# Patient Record
Sex: MALE | Race: BLACK OR AFRICAN AMERICAN
[De-identification: names, ages, dates, MRNs, and addresses within clinical notes are randomized per-mention and may not be internally consistent; named-entity substitution may affect disease eponyms.]

---

## 2018-02-23 ENCOUNTER — HOSPITAL ENCOUNTER (EMERGENCY)
Dept: HOSPITAL 62 - ER | Age: 23
Discharge: HOME | End: 2018-02-23
Payer: OTHER GOVERNMENT

## 2018-02-23 VITALS — SYSTOLIC BLOOD PRESSURE: 116 MMHG | DIASTOLIC BLOOD PRESSURE: 74 MMHG

## 2018-02-23 DIAGNOSIS — B34.9: Primary | ICD-10-CM

## 2018-02-23 DIAGNOSIS — R50.9: ICD-10-CM

## 2018-02-23 DIAGNOSIS — R55: ICD-10-CM

## 2018-02-23 DIAGNOSIS — R42: ICD-10-CM

## 2018-02-23 DIAGNOSIS — R51: ICD-10-CM

## 2018-02-23 LAB
A TYPE INFLUENZA AG: NEGATIVE
ADD MANUAL DIFF: NO
ALBUMIN SERPL-MCNC: 4.2 G/DL (ref 3.5–5)
ALP SERPL-CCNC: 75 U/L (ref 38–126)
ALT SERPL-CCNC: 31 U/L (ref 21–72)
ANION GAP SERPL CALC-SCNC: 11 MMOL/L (ref 5–19)
APPEARANCE UR: CLEAR
APTT PPP: YELLOW S
AST SERPL-CCNC: 25 U/L (ref 17–59)
B INFLUENZA AG: NEGATIVE
BASOPHILS # BLD AUTO: 0 10^3/UL (ref 0–0.2)
BASOPHILS NFR BLD AUTO: 0.6 % (ref 0–2)
BILIRUB DIRECT SERPL-MCNC: 0 MG/DL (ref 0–0.4)
BILIRUB SERPL-MCNC: 1 MG/DL (ref 0.2–1.3)
BILIRUB UR QL STRIP: NEGATIVE
BUN SERPL-MCNC: 10 MG/DL (ref 7–20)
CALCIUM: 9.5 MG/DL (ref 8.4–10.2)
CHLORIDE SERPL-SCNC: 105 MMOL/L (ref 98–107)
CK MB SERPL-MCNC: < 0.22 NG/ML (ref ?–4.55)
CO2 SERPL-SCNC: 25 MMOL/L (ref 22–30)
EOSINOPHIL # BLD AUTO: 0.1 10^3/UL (ref 0–0.6)
EOSINOPHIL NFR BLD AUTO: 2.2 % (ref 0–6)
ERYTHROCYTE [DISTWIDTH] IN BLOOD BY AUTOMATED COUNT: 13.3 % (ref 11.5–14)
GLUCOSE SERPL-MCNC: 91 MG/DL (ref 75–110)
GLUCOSE UR STRIP-MCNC: NEGATIVE MG/DL
HCT VFR BLD CALC: 44.5 % (ref 37.9–51)
HGB BLD-MCNC: 15.4 G/DL (ref 13.5–17)
KETONES UR STRIP-MCNC: NEGATIVE MG/DL
LYMPHOCYTES # BLD AUTO: 1.3 10^3/UL (ref 0.5–4.7)
LYMPHOCYTES NFR BLD AUTO: 32.2 % (ref 13–45)
MCH RBC QN AUTO: 32.3 PG (ref 27–33.4)
MCHC RBC AUTO-ENTMCNC: 34.6 G/DL (ref 32–36)
MCV RBC AUTO: 94 FL (ref 80–97)
MONOCYTES # BLD AUTO: 0.8 10^3/UL (ref 0.1–1.4)
MONOCYTES NFR BLD AUTO: 19.2 % (ref 3–13)
NEUTROPHILS # BLD AUTO: 1.8 10^3/UL (ref 1.7–8.2)
NEUTS SEG NFR BLD AUTO: 45.8 % (ref 42–78)
NITRITE UR QL STRIP: NEGATIVE
PH UR STRIP: 6 [PH] (ref 5–9)
PLATELET # BLD: 205 10^3/UL (ref 150–450)
POTASSIUM SERPL-SCNC: 4.2 MMOL/L (ref 3.6–5)
PROT SERPL-MCNC: 6.9 G/DL (ref 6.3–8.2)
PROT UR STRIP-MCNC: NEGATIVE MG/DL
RBC # BLD AUTO: 4.76 10^6/UL (ref 4.35–5.55)
SODIUM SERPL-SCNC: 141.2 MMOL/L (ref 137–145)
SP GR UR STRIP: 1.02
TOTAL CELLS COUNTED % (AUTO): 100 %
TROPONIN I SERPL-MCNC: < 0.012 NG/ML
UROBILINOGEN UR-MCNC: NEGATIVE MG/DL (ref ?–2)
WBC # BLD AUTO: 4 10^3/UL (ref 4–10.5)

## 2018-02-23 PROCEDURE — 82550 ASSAY OF CK (CPK): CPT

## 2018-02-23 PROCEDURE — 80053 COMPREHEN METABOLIC PANEL: CPT

## 2018-02-23 PROCEDURE — 96374 THER/PROPH/DIAG INJ IV PUSH: CPT

## 2018-02-23 PROCEDURE — 36415 COLL VENOUS BLD VENIPUNCTURE: CPT

## 2018-02-23 PROCEDURE — 93010 ELECTROCARDIOGRAM REPORT: CPT

## 2018-02-23 PROCEDURE — 96361 HYDRATE IV INFUSION ADD-ON: CPT

## 2018-02-23 PROCEDURE — 93005 ELECTROCARDIOGRAM TRACING: CPT

## 2018-02-23 PROCEDURE — 82553 CREATINE MB FRACTION: CPT

## 2018-02-23 PROCEDURE — 87804 INFLUENZA ASSAY W/OPTIC: CPT

## 2018-02-23 PROCEDURE — 85025 COMPLETE CBC W/AUTO DIFF WBC: CPT

## 2018-02-23 PROCEDURE — 84484 ASSAY OF TROPONIN QUANT: CPT

## 2018-02-23 PROCEDURE — 99284 EMERGENCY DEPT VISIT MOD MDM: CPT

## 2018-02-23 PROCEDURE — 71045 X-RAY EXAM CHEST 1 VIEW: CPT

## 2018-02-23 PROCEDURE — 81001 URINALYSIS AUTO W/SCOPE: CPT

## 2018-02-23 PROCEDURE — 86308 HETEROPHILE ANTIBODY SCREEN: CPT

## 2018-02-23 NOTE — RADIOLOGY REPORT (SQ)
EXAM DESCRIPTION:  CHEST SINGLE VIEW



COMPLETED DATE/TIME:  2/23/2018 7:26 am



REASON FOR STUDY:  sob



COMPARISON:  None.



EXAM PARAMETERS:  NUMBER OF VIEWS: One view.

TECHNIQUE: Single frontal radiographic view of the chest acquired.

RADIATION DOSE: NA

LIMITATIONS: None.



FINDINGS:  LUNGS AND PLEURA: No opacities, masses or pneumothorax. No pleural effusion.

MEDIASTINUM AND HILAR STRUCTURES: No masses.  Contour normal.

HEART AND VASCULAR STRUCTURES: Prominence of the cardiac silhouette and vasculature some of which cou
ld be related to the portable AP nature the film although appearance is suspicious for mild cardiomeg
marija and mild vascular congestion.

BONES: No acute findings.

HARDWARE: None in the chest.

OTHER: No other significant finding.



IMPRESSION:  Mild prominence of the cardiac silhouette and vasculature.



TECHNICAL DOCUMENTATION:  JOB ID:  [id number]

 2011 [employer]- All Rights Reserved

## 2018-02-23 NOTE — EKG REPORT
SEVERITY:- OTHERWISE NORMAL ECG -

SINUS ARRHYTHMIA, RATE [id number]

:

Confirmed by: Rick Shook MD 23-Feb-2018 08:04:56

## 2018-02-23 NOTE — ER DOCUMENT REPORT
ED Dizziness/Weakness





- General


Chief Complaint: Near Syncope


Stated Complaint: HEADACHE,DIZZINESS,FEVER


Time Seen by Provider: 02/23/18 06:26


Notes: 





This is a 22-year-old -American male.  [employer] duty [employer].  Has not felt 

well for couple of days.  Has not seen his medical providers.  Has been going 

to work.  States that he has been having on and off fevers, headache, chills, 

sore throat and generally not feeling well.  Today felt like he was going to 

pass out.  He did not pass out but felt so weak that he did not think he can 

[place] it [place].  Was brought here by his girlfriend.


TRAVEL OUTSIDE OF THE [place] IN LAST 30 DAYS: No





- HPI


Patient complains to provider of: Dizziness, Near-syncope


Onset: Just prior to arrival


Onset/Duration: Sudden


Associated symptoms: Dizzy, Almost fainted, Headache, Lightheaded, Weak all over





- Related Data


Allergies/Adverse Reactions: 


 





No Known Allergies Allergy (Unverified 02/23/18 06:00)


 











Past Medical History





- General


Information source: Patient





- Social History


Smoking Status: Never Smoker


Cigarette use (# per day): No


Frequency of alcohol use: None


Drug Abuse: None


Lives with: Alone


Family History: Reviewed & Not Pertinent


Patient has suicidal ideation: No


Patient has homicidal ideation: No





- Past Medical History


Cardiac Medical History: Reports: None


Pulmonary Medical History: Reports: None


EENT Medical History: Reports: None


Neurological Medical History: Reports: None


Renal/ Medical History: Reports: None.  Denies: Hx Peritoneal Dialysis


Malignancy Medical History: Reports None


GI Medical History: Reports: None


Musculoskeltal Medical History: Reports None


Skin Medical History: Reports None


Psychiatric Medical History: Reports: None


Traumatic Medical History: Reports: None


Past Surgical History: Reports: None





- Immunizations


Immunizations up to date: Yes





Review of Systems





- Review of Systems


Constitutional: Chills, Fever, Weakness


EENT: Ear pain, Nose congestion, Throat pain.  denies: Blurred vision, Tearing, 

Double vision


Cardiovascular: Dizziness, Lightheaded.  denies: Chest pain, Palpitations, 

Heart racing


Gastrointestinal: denies: Abdominal pain, Diarrhea, Nausea, Vomiting


Genitourinary: denies: Burning, Dysuria, Discharge


Musculoskeletal: denies: Back pain, Gout, Joint pain


Skin: denies: Lesions, Lumps, Rash


Hematologic/Lymphatic: denies: Anemia, Blood clots, Easy bleeding, Easy bruising


Neurological/Psychological: Headaches.  denies: Confusion, Weakness





Physical Exam





- Vital signs


Vitals: 


 











Temp Pulse Resp BP Pulse Ox


 


 98.2 F   57 L  14   138/75 H  100 


 


 02/23/18 06:03  02/23/18 06:03  02/23/18 06:03  02/23/18 06:03  02/23/18 06:03











Interpretation: Normal





- General


General appearance: Appears well, Alert





- HEENT


Head: Normocephalic, Atraumatic


Eyes: Normal


Pupils: PERRL


Neck: Normal.  No: Anterior cervical chain, Posterior cervical chain, Brudzinski

, Kernig's, Meningismus





- Respiratory


Respiratory status: No respiratory distress


Chest status: Nontender


Breath sounds: Normal


Chest palpation: Normal





- Cardiovascular


Rhythm: Regular


Heart sounds: Normal auscultation


Murmur: No





- Abdominal


Inspection: Normal


Distension: No distension


Bowel sounds: Normal


Tenderness: Nontender


Organomegaly: No organomegaly





- Back


Back: Normal, Nontender





- Extremities


General upper extremity: Normal inspection, Nontender, Normal color, Normal ROM

, Normal temperature


General lower extremity: Normal inspection, Nontender, Normal color, Normal ROM

, Normal temperature, Normal weight bearing.  No: Yfn's sign





- Neurological


Neuro grossly intact: Yes


Cognition: Normal


Orientation: AAOx4


[place] Coma Scale Eye Opening: Spontaneous


[place] Coma Scale Verbal: Oriented


[place] Coma Scale Motor: Obeys Commands


[place] Coma Scale Total: 15


Speech: Normal


Motor strength normal: LUE, RUE, LLE, RLE


Sensory: Normal





- Psychological


Associated symptoms: Normal affect, Normal mood





- Skin


Skin Temperature: Warm


Skin Moisture: Dry


Skin Color: Normal





Course





- Re-evaluation


Re-evalutation: 





02/23/18 07:37


This is a well-appearing 22-year-old male complaining of more viral-like 

symptoms.  May have some dehydration.  States that he almost passed out.  We 

will get basic labs, chest x-ray.  We will give him some fluids.  See if this 

helps any of his symptoms.   Of note he has no fever at this time and has not 

taken anything this morning so unlikely this is a meningitis picture.


02/23/18 09:22


Labs are unremarkable at this time.  Patient states his headache is much 

better.  Denies any stiffness in his neck.  Chest x-ray showed prominent 

cardiac silhouette.  Patient does not have a murmur.  Does not have any chest 

pain at this time.  Am adding basic CK-MB and troponin just to make sure that 

this is not a viral myocarditis or something.  Patient still receiving fluids 

at this time.  Repeat exam of the neck reveals no Kernig's, negative 

Brudzinski.  Negative fever, negative white blood cell count.  No concerns in 

this time in my mind for meningitis.


02/23/18 10:53


CK, CK-MB troponin added based on radiologist's interpretation of the prominent 

cardiac silhouette.  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 

that particular chest x-ray reading felt compelled to add cardiac labs to make 

sure this was not symptoms consistent with a myocarditis.  There is no evidence 

of cardiac stress with a normal CK CK-MB and troponin as well as EKG.  Patient 

feeling much better at this time.  Sitting upright.  Comfortable discharging.  

Has close outpatient follow-up at [facility].





- Vital Signs


Vital signs: 


 











Temp Pulse Resp BP Pulse Ox


 


 98.2 F   57 L  13   112/62   99 


 


 02/23/18 06:03  02/23/18 06:03  02/23/18 10:01  02/23/18 10:01  02/23/18 10:01














- Laboratory


Result Diagrams: 


 02/23/18 07:02





 02/23/18 07:02


Laboratory results interpreted by me: 


 











  02/23/18 02/23/18





  07:02 07:02


 


Monocytes %  19.2 H 


 


Creatine Kinase   205 H














- EKG Interpretation by Me


EKG shows normal: Sinus rhythm, Axis, Intervals, QRS Complexes, ST-T Waves





Discharge





- Discharge


Clinical Impression: 


 Viral syndrome





Condition: Good


Disposition: HOME, SELF-CARE


Instructions:  Acetaminophen, Fever ([facility]), Viral Syndrome ([facility])


Additional Instructions: 


No work for the next 3 days.  Strict bed rest with plenty of fluids.  Tylenol 

and ibuprofen for pain and fever.  It will be very important that you follow-[place] 

[place] in [place] next 24-48 hours.  Return immediately 

for any symptoms of concern especially worsening headache, fever with headache 

and neck stiffness, inability to eat or drink, passing out, chest pain or any 

other pain.